# Patient Record
Sex: FEMALE | Race: WHITE | Employment: UNEMPLOYED | URBAN - METROPOLITAN AREA
[De-identification: names, ages, dates, MRNs, and addresses within clinical notes are randomized per-mention and may not be internally consistent; named-entity substitution may affect disease eponyms.]

---

## 2017-02-14 ENCOUNTER — OFFICE VISIT (OUTPATIENT)
Dept: FAMILY MEDICINE CLINIC | Age: 27
End: 2017-02-14

## 2017-02-14 ENCOUNTER — HOSPITAL ENCOUNTER (OUTPATIENT)
Dept: LAB | Age: 27
Discharge: HOME | End: 2017-02-14

## 2017-02-14 VITALS
TEMPERATURE: 98.7 F | SYSTOLIC BLOOD PRESSURE: 109 MMHG | WEIGHT: 181 LBS | DIASTOLIC BLOOD PRESSURE: 66 MMHG | HEART RATE: 71 BPM | HEIGHT: 62 IN | BODY MASS INDEX: 33.31 KG/M2

## 2017-02-14 DIAGNOSIS — E04.9 GOITER: Primary | ICD-10-CM

## 2017-02-14 DIAGNOSIS — E03.9 ACQUIRED HYPOTHYROIDISM: ICD-10-CM

## 2017-02-14 LAB
ALBUMIN SERPL BCP-MCNC: 4.1 G/DL (ref 3.5–5)
ALBUMIN/GLOB SERPL: 1.1 {RATIO} (ref 1.1–2.2)
ALP SERPL-CCNC: 170 U/L (ref 45–117)
ALT SERPL-CCNC: 31 U/L (ref 12–78)
ANION GAP BLD CALC-SCNC: 8 MMOL/L (ref 5–15)
AST SERPL W P-5'-P-CCNC: 16 U/L (ref 15–37)
BILIRUB SERPL-MCNC: 0.3 MG/DL (ref 0.2–1)
BUN SERPL-MCNC: 10 MG/DL (ref 6–20)
BUN/CREAT SERPL: 16 (ref 12–20)
CALCIUM SERPL-MCNC: 9.3 MG/DL (ref 8.5–10.1)
CHLORIDE SERPL-SCNC: 103 MMOL/L (ref 97–108)
CO2 SERPL-SCNC: 28 MMOL/L (ref 21–32)
CREAT SERPL-MCNC: 0.61 MG/DL (ref 0.55–1.02)
EST. AVERAGE GLUCOSE BLD GHB EST-MCNC: 108 MG/DL
GLOBULIN SER CALC-MCNC: 3.8 G/DL (ref 2–4)
GLUCOSE SERPL-MCNC: 77 MG/DL (ref 65–100)
HBA1C MFR BLD: 5.4 % (ref 4.2–6.3)
POTASSIUM SERPL-SCNC: 4.6 MMOL/L (ref 3.5–5.1)
PROT SERPL-MCNC: 7.9 G/DL (ref 6.4–8.2)
SODIUM SERPL-SCNC: 139 MMOL/L (ref 136–145)

## 2017-02-14 PROCEDURE — 84443 ASSAY THYROID STIM HORMONE: CPT | Performed by: NURSE PRACTITIONER

## 2017-02-14 PROCEDURE — 80053 COMPREHEN METABOLIC PANEL: CPT | Performed by: NURSE PRACTITIONER

## 2017-02-14 PROCEDURE — 83036 HEMOGLOBIN GLYCOSYLATED A1C: CPT | Performed by: NURSE PRACTITIONER

## 2017-02-14 RX ORDER — LEVOTHYROXINE SODIUM 125 UG/1
TABLET ORAL
COMMUNITY
End: 2017-02-15 | Stop reason: DRUGHIGH

## 2017-02-14 NOTE — PROGRESS NOTES
Tayo Barajas, MSN, RN, FNP-BC, BC-ADM  [facility] [place]:      Lorena Sen is an 27 y.o. female who presents for {thyroid dx:595619::\"hypothyroidism\"}. Chief Complaint   Patient presents with    Thyroid Problem     Recheck, med refill     She brought her medications in today. {yes/ no default no:19426::\"no\"}  Torsten Smith has run out of medications . {yes/ no default no:19426::\"no\"}  No outpatient prescriptions prior to visit. No facility-administered medications prior to visit. Thyroid  She has ***. She denies fatigue, weight changes, heat/cold intolerance, bowel/skin changes or cardiovascular symptoms      Objective:     Visit Vitals    /66 (BP 1 Location: Left arm, BP Patient Position: Sitting)    Pulse 71    Temp 98.7 °F (37.1 °C) (Oral)    Ht 5' 2.21\" (1.58 m)    Wt 181 lb (82.1 kg)    LMP 10/10/2016 (Exact Date)    BMI 32.89 kg/m2    Patient's last menstrual period was 10/10/2016 (exact date). Eyes: no proptosis. Lungs: CTAB. Heart: RRR, no MRG. Neck: {thyroid:621977::\"thyroid is normal in size without nodules or tenderness\"}. Laboratory: Labs reviewed and discussed with the patient. No results found for: TSH, TSH2, TSH3, TSHP, TSHELE, TSHEXT, TT3, T3U, T3UP, FRT3, FT3, FT4, FT4P, T4, T4P, FT4T, TT7, TSHEXT      Assessment/Plan:   There are no diagnoses linked to this encounter.

## 2017-02-14 NOTE — PROGRESS NOTES
Joseline Navarrete, MSN, RN, FNP-BC, BC-ADM  [facility] [place]  Subjective:      Saintclair Funk is an 27 y.o. female who presents for hypothyroidism. Chief Complaint   Patient presents with    Thyroid Problem     Recheck, med refill   Adebayo Churchill interpreting. She brought her medications in today. They are in the car. Her  brought them in. She feels her neck pulsing and feels choking. A doctor at [facility] checked her a month ago and said she needed a surgery. Current medication: levothyroxine 125 mcg. She is breastfeeding some/bottle feeding also, has 11month old girl, has gained 20 lbs since giving birth. Constipation, hard stool always and has bleeding  Has the contraceptive \"chip\" in her arm and has not had a period since then. Sudden stabbing pains in her eyes and burning sensation even while sleeping. no  Claude Augusto has run out of medications . no  She started with symptoms in 2014. She is from [place]. She was cold, had swelling in feet, was gaining weight, nausea. Now just having weight gain, choking. No outpatient prescriptions prior to visit. No facility-administered medications prior to visit. Thyroid  + weight gain while nursing her 11 month old, not eating a lot; + constipation; choking symptoms She denies fatigue, heat/cold intolerance, or cardiovascular symptoms      Objective:     Visit Vitals    /66 (BP 1 Location: Left arm, BP Patient Position: Sitting)    Pulse 71    Temp 98.7 °F (37.1 °C) (Oral)    Ht 5' 2.21\" (1.58 m)    Wt 181 lb (82.1 kg)    LMP 10/10/2016 (Exact Date)    BMI 32.89 kg/m2    Patient's last menstrual period was 10/10/2016 (exact date). has contraception, no periods. Eyes: no proptosis. Lungs: CTAB. Heart: RRR, no MRG. Neck: thyroid enlarged, no tremor noted. No nodules or tenderness. Assessment/Plan:   Claude Casas was seen today for thyroid problem.     Diagnoses and all orders for this visit:    Goiter    Acquired hypothyroidism  - TSH 3RD GENERATION; Future  -     HEMOGLOBIN A1C WITH EAG; Future  -     METABOLIC PANEL, COMPREHENSIVE; Future  -     COLLECTION VENOUS BLOOD,VENIPUNCTURE    She has a goiter and is symptomatic. Check TSH, follow. If high, consider TPO Ab and ultrasound. If low, Free T4, total T3, consult with Dr. Le eAnn Jenkins.

## 2017-02-15 DIAGNOSIS — E03.9 ACQUIRED HYPOTHYROIDISM: Primary | ICD-10-CM

## 2017-02-15 LAB — TSH SERPL DL<=0.05 MIU/L-ACNC: 0.25 UIU/ML (ref 0.36–3.74)

## 2017-02-15 RX ORDER — LEVOTHYROXINE SODIUM 112 UG/1
112 TABLET ORAL
Qty: 90 TAB | Refills: 0
Start: 2017-02-15 | End: 2017-02-21 | Stop reason: SDUPTHER

## 2017-02-16 NOTE — PROGRESS NOTES
She is currently taking too MUCH thyroid medication. I will send in a lower dose; return 6 weeks as a walk-in for labs and 8 weeks to see a provider. I will order all the tests needed.

## 2017-02-21 RX ORDER — LEVOTHYROXINE SODIUM 112 UG/1
112 TABLET ORAL
Qty: 90 TAB | Refills: 0 | Status: SHIPPED | OUTPATIENT
Start: 2017-02-21 | End: 2017-05-19 | Stop reason: SDUPTHER

## 2017-02-21 NOTE — PROGRESS NOTES
Contacted pt, no pharmacy was selected so routing the levothyroxine ordered by provider PATRICIO to [facility] [address], pt has appt for labs only visit on 3/28. Explained that pt needs to return in 8 weeks from now for a provider visit and to make the line. This has been fully explained to the patient, who indicates understanding.

## 2017-03-28 ENCOUNTER — HOSPITAL ENCOUNTER (OUTPATIENT)
Dept: LAB | Age: 27
Discharge: HOME | End: 2017-03-28

## 2017-03-28 ENCOUNTER — LAB ONLY (OUTPATIENT)
Dept: FAMILY MEDICINE CLINIC | Age: 27
End: 2017-03-28

## 2017-03-28 DIAGNOSIS — E03.9 ACQUIRED HYPOTHYROIDISM: ICD-10-CM

## 2017-03-28 LAB
T4 FREE SERPL-MCNC: 0.8 NG/DL (ref 0.8–1.5)
TSH SERPL DL<=0.05 MIU/L-ACNC: 1.58 UIU/ML (ref 0.36–3.74)

## 2017-03-28 PROCEDURE — 84480 ASSAY TRIIODOTHYRONINE (T3): CPT | Performed by: NURSE PRACTITIONER

## 2017-03-28 PROCEDURE — 84439 ASSAY OF FREE THYROXINE: CPT | Performed by: NURSE PRACTITIONER

## 2017-03-28 PROCEDURE — 84443 ASSAY THYROID STIM HORMONE: CPT | Performed by: NURSE PRACTITIONER

## 2017-03-30 LAB — T3 SERPL-MCNC: 109 NG/DL (ref 71–180)

## 2017-05-24 NOTE — TELEPHONE ENCOUNTER
called asking for med refill. Rx refill request in pending approval from Stephanie Mckeon.   Message forwarded to Michael Martinez

## 2017-05-26 RX ORDER — LEVOTHYROXINE SODIUM 112 UG/1
TABLET ORAL
Qty: 90 TAB | Refills: 0 | Status: SHIPPED | OUTPATIENT
Start: 2017-05-26 | End: 2017-07-18 | Stop reason: SDUPTHER

## 2017-05-26 NOTE — TELEPHONE ENCOUNTER
Called returned to spouse, Evan John, who is down as emergency contact on [facility]. Informed we need to speak to pt as medications sent to pharmacy today and appt for f/u will be placed in mail to pt. F/u appt scheduled on 7?18/17 at 1:15p w/ provider Jose Bello NP at [address] location and will be placed in mail to pt. Call assisted by [employer] #[phone].

## 2017-05-26 NOTE — TELEPHONE ENCOUNTER
2nd fax request from pharmacy for refill on Synthroid 112 mcg following a phone call from family 5/24/17 noted in system. Will refill for 90 tabs as heading into long holiday w/e. Routing to Dr Charlee Cazares and provider Essence Joseph.

## 2017-05-30 RX ORDER — LEVOTHYROXINE SODIUM 112 UG/1
TABLET ORAL
Qty: 90 TAB | Refills: 0 | Status: SHIPPED | OUTPATIENT
Start: 2017-05-30 | End: 2017-07-18 | Stop reason: SDUPTHER

## 2017-05-30 NOTE — TELEPHONE ENCOUNTER
Telephone call made to the patient with the assistance of [employer] interrpeter # [id number], to let her know about the Levothyroxine prescription sent in to the [facility], [address] [place] [address] for her. The patient expressed understanding and stated she will pick it up today.  Rosalva Gardner RN

## 2017-05-31 ENCOUNTER — TELEPHONE (OUTPATIENT)
Dept: FAMILY MEDICINE CLINIC | Age: 27
End: 2017-05-31

## 2017-05-31 NOTE — TELEPHONE ENCOUNTER
The pt's friend had called this am requesting Levothyroxine refill for the pt. Ovidio Lee RN    This nurse called the [facility] on on [address] The pt's medication had been filled and picked up last night, per the Pharmacy. Attempted calling the pt using  #[phone]. No answer. A message was left for the pt to call the [facility] and speak with the nurse.  Ovidio Lee RN

## 2017-07-18 ENCOUNTER — OFFICE VISIT (OUTPATIENT)
Dept: FAMILY MEDICINE CLINIC | Age: 27
End: 2017-07-18

## 2017-07-18 ENCOUNTER — HOSPITAL ENCOUNTER (OUTPATIENT)
Dept: LAB | Age: 27
Discharge: HOME | End: 2017-07-18

## 2017-07-18 VITALS
BODY MASS INDEX: 33.86 KG/M2 | TEMPERATURE: 98.7 F | WEIGHT: 184 LBS | HEIGHT: 62 IN | DIASTOLIC BLOOD PRESSURE: 75 MMHG | SYSTOLIC BLOOD PRESSURE: 109 MMHG | HEART RATE: 78 BPM

## 2017-07-18 DIAGNOSIS — E03.9 ACQUIRED HYPOTHYROIDISM: Primary | ICD-10-CM

## 2017-07-18 DIAGNOSIS — E03.9 ACQUIRED HYPOTHYROIDISM: ICD-10-CM

## 2017-07-18 LAB — TSH SERPL DL<=0.05 MIU/L-ACNC: 7.55 UIU/ML (ref 0.36–3.74)

## 2017-07-18 PROCEDURE — 84443 ASSAY THYROID STIM HORMONE: CPT | Performed by: NURSE PRACTITIONER

## 2017-07-18 RX ORDER — LEVOTHYROXINE SODIUM 112 UG/1
TABLET ORAL
Qty: 90 TAB | Refills: 0 | Status: SHIPPED | OUTPATIENT
Start: 2017-07-18 | End: 2017-07-30 | Stop reason: DRUGHIGH

## 2017-07-18 NOTE — PROGRESS NOTES
Coordination of Care  1. Have you been to the ER, urgent care clinic since your last visit? Hospitalized since your last visit? No    2. Have you seen or consulted any other health care providers outside of the [address] [place] [address] since your last visit? Include any pap smears or colon screening. No    Medications  Medication Reconciliation Performed: no  Patient does need refills     Learning Assessment Complete?  yes

## 2017-07-18 NOTE — MR AVS SNAPSHOT
Visit Information Excell Modesto Almeida Personal Médico Departamento Teléfono del Dep. Número de visita 7/18/2017  1:15 PM Preston Conteh NP [facility] [phone] Follow-up Instructions Return in about 2 months (around 9/18/2017) for as a walk-in for thyroid. Abby Jenise Upcoming Health Maintenance Date Due DTaP/Tdap/Td series (1 - Tdap) 4/8/2009 PAP AKA CERVICAL CYTOLOGY 4/8/2009 INFLUENZA AGE 9 TO ADULT 8/1/2017 Alergias  Review Complete El: 7/18/2017 Por: Parag Gut A partir del:  7/18/2017 No Known Allergies Vacunas actuales Sarahi Fletcher No hay ninguna vacuna archivada. No revisadas esta visita You Were Diagnosed With   
  
 [place] Acquired hypothyroidism    -  Primary ICD-10-CM: E03.9 ICD-9-CM: 244.9 Partes vitales PS Pulso Temperatura [place] ( percentil de crecimiento) Peso (percentil de crecimiento) [place] [place]  
 109/75 (BP 1 Location: Left arm, BP Patient Position: Sitting) 78 98.7 °F (37.1 °C) (Oral) 5' 2.3\" (1.582 m) 184 lb (83.5 kg) 33.33 kg/m2 Estado obstétrico Estatus de tabaquísmo Implant Never Smoker Historial de signos vitales BMI and BSA Data Body Mass Index Body Surface Area  
 33.33 kg/m 2 1.92 m 2 [facility] Name Phone [address] Al lista de medicamentos actualizada Lista actualizada el: 7/18/17  2:22 PM.  Siempre use al lista de medicamentos más reciente. levothyroxine 112 mcg tablet También conocido mich:  SYNTHROID  
TAKE ONE TABLET BY MOUTH ONCE DAILY (BEFORE BREAKFAST). Greek sig Recetas Enviado a la Nba Refills  
 levothyroxine (SYNTHROID) 112 mcg tablet 0 Sig: TAKE ONE TABLET BY MOUTH ONCE DAILY (BEFORE BREAKFAST). Greek sig  Class: Normal  
 Pharmacy: [facility] [address], [address] #: [phone] Instrucciones de seguimiento Return in about 2 months (around 9/18/2017) for as a walk-in for thyroid. Yahir Lazar Por hacer 07/18/2017 Lab:  TSH 3RD GENERATION   
  
 07/18/2017 Imaging:  US THYROID/PARATHYROID/SOFT TISS Introducing [facility]! [facility] introduce portal paciente MyChart . Ahora se puede acceder a partes de brown expediente médico, enviar por correo electrónico la oficina de brown médico y solicitar renovaciones de medicamentos en línea. En brown navegador de Internet , Clementeen Pretzel a [email or web address] [employer]. Pin [email or web address]/mychart Santiago clic en el usuario por Cedric Martinez? Taye See clic aquí en la sesión Deo [place]. Verá la página de registro [place]. Ingrese brown código de [employer] y mich aparece a continuación. Usted no tendrá que [employer] código después de morenita completado el proceso de registro . Si usted no se inscribe antes de la fecha de caducidad , debe solicitar un nuevo código. · MyChart Código de acceso : Radu Patel Expires: 10/16/2017  2:21 PM 
 
Ingresa los últimos cuatro dígitos de brown Número de Seguro Social ( xxxx ) y fecha de nacimiento ( dd / mm / aaaa ) mich se indica y santiago clic en Enviar. Usted será llevado a la siguiente página de registro . Crear un ID MyChart . Esta será brown ID de inicio de sesión de MyChart y no puede ser [place] , por lo que pensar en balaji que es Gemma Grills y fácil de recordar . Crear balaji contraseña MyChart . Usted puede cambiar brown contraseña en cualquier momento . Ingrese brown Password Reset de preguntas y Shanks . [place] se puede utilizar en un momento posterior si usted olvida brown contraseña. Introduzca brown dirección de correo electrónico . Dearl Bird recibirá balaji notificación por correo electrónico cuando la nueva información está disponible en MyChart . Arelis sousa en Registrarse. Cherylene Founds carson y descargar porciones de brown expediente médico. 
Ursula merry en el enlace de descarga del menú Resumen para descargar balaji copia portátil de brown información médica . Si tiene [employer] , por favor visite la sección de preguntas frecuentes del sitio web MyChart . Recuerde, MyChart NO es que se utilizará para las necesidades urgentes. Para emergencias médicas , llame al 911 . Ahora disponible en brown iPhone y Android ! Por favor proporcione cindy resumen de la documentación de cuidado a brown próximo proveedor. If you have any questions after today's visit, please call [phone].

## 2017-07-18 NOTE — PROGRESS NOTES
Reynold Lopez, MSN, RN, FNP-BC, BC-ADM  [facility]  Subjective:    Children ages 5, 10, and 3  Please schedule thyroid ultrasound. We will call her if medicines need changing based on labs. Have her please check what mcg dose she has been taking at home to make sure it is 112 mcg (the new dose). Follow up 2 months. If abnormal thyroid ultrasound requiring change in treatment plan, will call. Tiffany Tripathi is an 32 y.o. female who presents for hypothyroidism follow up. Chief Complaint   Patient presents with    Thyroid Problem     f/u   Her lips are very dry. Back on the medicine since June. Having constipation. She brought her medications in today. no  Andres Cabrales has run out of medications . no  Outpatient Medications Prior to Visit   Medication Sig Dispense Refill    levothyroxine (SYNTHROID) 112 mcg tablet TAKE ONE TABLET BY MOUTH ONCE DAILY (BEFORE BREAKFAST) 90 Tab 0    levothyroxine (SYNTHROID) 112 mcg tablet TAKE ONE TABLET BY MOUTH ONCE DAILY (BEFORE BREAKFAST) 90 Tab 0     No facility-administered medications prior to visit. Thyroid  She has gained weight, lips dry, constipation, and feels fatigued. She denies heat/cold intolerance or cardiovascular symptoms      Objective:     Visit Vitals    /75 (BP 1 Location: Left arm, BP Patient Position: Sitting)    Pulse 78    Temp 98.7 °F (37.1 °C) (Oral)    Ht 5' 2.3\" (1.582 m)    Wt 184 lb (83.5 kg)    BMI 33.33 kg/m2    No LMP recorded. Patient has had an implant. Eyes: no proptosis. Lungs: CTAB. Heart: RRR, no MRG. Neck: thyroid enlarged, no tremor noted. Laboratory: Labs reviewed and discussed with the patient. Lab Results   Component Value Date/Time    TSH 1.58 03/28/2017 10:56 AM    T4, Free 0.8 03/28/2017 10:56 AM         Assessment/Plan:   Andres Cabrales was seen today for thyroid problem.     Diagnoses and all orders for this visit:    Acquired hypothyroidism  -     US THYROID/PARATHYROID/SOFT TISS; Future

## 2017-07-18 NOTE — PROGRESS NOTES
Statements below were documented by Rika Cleaning RN My  for this patient visit was yualejoineenyPatient discharged home with AVS and Medication teaching . No further questions. Reviewed patient's medications, how to take, where to pickup and if any refills, patient verbalized understanding and has no questionsPatient given information about care card because the test ordered will be billed to patient . Patient explained to answer phone because a call would be coming in from [facility] to start the financial screening process fro Care Card. Patient verbalized understanding and has no questions . Patient explained that when bill is received to please call the number on the bill and explain that they are working on getting the care card. Patient acknowleged and understands the process without aany questions . Patient informed that if [facility] does not call to contact them in 3 weeks  to please give them a call . Thyroid ulltrasound Scheduled for July 25, 2017 ( Tuesday ) @ 0900 @ [facility] instructed to arrive at 0830 to register . Directions and address given to patient. Verbalized understanding of information discussed at discharge .  Rika Cleaning RN

## 2017-07-20 NOTE — PROGRESS NOTES
She said she had been taking the new dose of levothyroxine, 112 mcg. Now her thyroid test indicates this dose is too high. Will await ultrasound results, have her return with her medication bottle next time to visually verify current dose.

## 2017-07-25 ENCOUNTER — HOSPITAL ENCOUNTER (OUTPATIENT)
Dept: ULTRASOUND IMAGING | Age: 27
Discharge: HOME | End: 2017-07-25
Attending: NURSE PRACTITIONER
Payer: SELF-PAY

## 2017-07-25 DIAGNOSIS — E03.9 ACQUIRED HYPOTHYROIDISM: ICD-10-CM

## 2017-07-25 PROCEDURE — 76536 US EXAM OF HEAD AND NECK: CPT

## 2017-07-27 NOTE — PROGRESS NOTES
Correction: when her TSH increased to 7.55, this indicated that the dose of 112 mcg was too low. When she took the higher dose of 125 mcg levothyroxine, her TSH was too low (0.25).

## 2017-07-27 NOTE — PROGRESS NOTES
Adeladenis Garcia would first make sure she is compliant with medication and taking properly (Empty stomach, 30 min before food and coffee and all other pills and no vitamins within 4 hours) as if she has even missed 1 dose per week over the past 4-6 week, this will lead to a rise in TSH. It's likely the 125 mcg dose is a better dose for her so I would go back to this dose. It's possible some of the variability in her TSH is from the variability of generic and if she is unable to keep her TSH regulated on levothyroxine, I would try to get her 125 mcg synthroid tabs.     Pingree Flash

## 2017-07-27 NOTE — PROGRESS NOTES
Dr. Graceann Jeans, she has a goiter that by ultrasound is heterogeneous with no discrete nodules. On 125 mcg levothyroxine, her TSH was 0.25. I lowered her to 112 mcg levothyroxine, and then her TSH was 7.55 with hypothyroid symptoms. Does this need additional workup? If she needs an in-between dose, might I try 125mcg, take 6 days a week? Thank you!

## 2017-07-27 NOTE — PROGRESS NOTES
When her TSH was too low, I decreased her levothyroxine dose from 125 mcg to 112 mcg. Then, her TSH was normal.  She continued taking the 112 mcg and then her TSH was elevated. She has a heterogeneous goiter on ultrasound with no nodules.

## 2017-07-30 DIAGNOSIS — E03.9 ACQUIRED HYPOTHYROIDISM: Primary | ICD-10-CM

## 2017-07-30 RX ORDER — LEVOTHYROXINE SODIUM 125 UG/1
125 TABLET ORAL
Qty: 90 TAB | Refills: 0 | Status: SHIPPED | OUTPATIENT
Start: 2017-07-30 | End: 2017-11-11 | Stop reason: SDUPTHER

## 2017-07-30 NOTE — PROGRESS NOTES
Nurses, after discussion with Dr. Omar Mccall, Ms. Julio Cesar Casas needs to STOP the 112 mcg dose of levothyroxine. I am sending in the 125 mcg tabs to take 1 po daily. These need to be taken 30 minutes before food, coffee, and all other pills; no vitamins within 4 hours. In 8 weeks (around 10/1/17), she is to return for lab tests. I will pre-order those tests now.

## 2017-07-30 NOTE — PROGRESS NOTES
See Result Note.   Orders Placed This Encounter    TSH 3RD GENERATION to be drawn on or after 10/1/2017    levothyroxine (SYNTHROID) 125 mcg tablet

## 2017-07-31 NOTE — PROGRESS NOTES
Attempted to reach patient, left VM to please call cvan office and speak to nurse re: important message from your Dr re: medication.

## 2017-08-02 ENCOUNTER — TELEPHONE (OUTPATIENT)
Dept: FAMILY MEDICINE CLINIC | Age: 27
End: 2017-08-02

## 2017-08-02 NOTE — PROGRESS NOTES
4 hours ago (11:13 AM)                   You 4 hours ago (10:45 AM)              The pt was called regarding the provider message after her thyroid US. The pt was given the message to stop her Levothyroxine 112 mcg, and go  new rx at her Pharmacy, and start taking it. The new rx is Levothyroxine 125 mcg. The pt's Pharmacy was confirmed. The pt was instructed to take the medication 30 min before food or coffee and not to take any other pills with the Levothyroxine. The pt was told not to take any Vitamins for 4 hrs after taking the Levothyroxine. The pt verbalized understanding and stated she takes her Vitamin at night. The  who assisted with the call was [employer]. #[phone]. The pt was instructed to return to the clinic in 8 weeks to have her blood work repeated. She was told to return around 10/01/17. She could walk in and just let the registrar know she was there for labs only. The order was already in her chart for what she needed drawn, per provider. The pt asked about her Thyroid US results. There was no provider note specifically  addressing the results. The pt was told a message would be routed to the provider and the nurse would call her back.   Domonique Gan RN                     Documentation

## 2017-08-02 NOTE — TELEPHONE ENCOUNTER
The pt was called regarding the provider message after her thyroid US. The pt was given the message to stop her Levothyroxine 112 mcg, and go  new rx at her Pharmacy, and start taking it. The new rx is Levothyroxine 125 mcg. The pt's Pharmacy was confirmed. The pt was instructed to take the medication 30 min before food or coffee and not to take any other pills with the Levothyroxine. The pt was told not to take any Vitamins for 4 hrs after taking the Levothyroxine. The pt verbalized understanding and stated she takes her Vitamin at night. The  who assisted with the call was [employer]. #[phone]. The pt was instructed to return to the clinic in 8 weeks to have her blood work repeated. She was told to return around 10/01/17. She could walk in and just let the registrar know she was there for labs only. The order was already in her chart for what she needed drawn, per provider. The pt asked about her Thyroid US results. There was no provider note specifically addressing the results. The pt was told a message would be routed to the provider and the nurse would call her back.   Earle Lopez RN

## 2017-08-03 NOTE — PROGRESS NOTES
The thyroid ultrasound showed that her thyroid is enlarged - this is not a new finding. There are no \"nodules\" so there is no concern for cancer. The thyroid function is off - that is why a dose change in medication is needed at this time.

## 2017-08-09 NOTE — PROGRESS NOTES
Telephone call made to the patient with assistance from [employer]  # [phone]. We reviewed the provider's result note about her Thyroid US and the patient stated she has been taking the prescribed Levothyroxine. She then asked about what to do with her bill for the [address] [facility] and stated that she has called the phone number for the Care Card, but has not received a return call. Advised the patient that the [facility] is not involved with any billing and to call the phone number on the bill to let them know she is waiting to hear about her Care Card. She can also call the Care Card phone number again and may bring the bill to one of the [facility] clinics and meet with a SW/OW for help. The patient expressed understanding.  Violeta Cazares RN

## 2017-11-11 ENCOUNTER — OFFICE VISIT (OUTPATIENT)
Dept: FAMILY MEDICINE CLINIC | Age: 27
End: 2017-11-11

## 2017-11-11 VITALS
DIASTOLIC BLOOD PRESSURE: 67 MMHG | SYSTOLIC BLOOD PRESSURE: 102 MMHG | TEMPERATURE: 98.3 F | WEIGHT: 176 LBS | HEART RATE: 66 BPM | BODY MASS INDEX: 31.88 KG/M2

## 2017-11-11 DIAGNOSIS — E03.9 ACQUIRED HYPOTHYROIDISM: Primary | ICD-10-CM

## 2017-11-11 DIAGNOSIS — Z23 ENCOUNTER FOR IMMUNIZATION: ICD-10-CM

## 2017-11-11 DIAGNOSIS — Z30.41 FAMILY PLANNING, BCP (BIRTH CONTROL PILLS) MAINTENANCE: ICD-10-CM

## 2017-11-11 DIAGNOSIS — M54.50 ACUTE MIDLINE LOW BACK PAIN WITHOUT SCIATICA: ICD-10-CM

## 2017-11-11 RX ORDER — CYCLOBENZAPRINE HCL 10 MG
10 TABLET ORAL
Qty: 20 TAB | Refills: 0 | Status: SHIPPED | OUTPATIENT
Start: 2017-11-11

## 2017-11-11 RX ORDER — LEVOTHYROXINE SODIUM 125 UG/1
125 TABLET ORAL
Qty: 90 TAB | Refills: 0 | Status: SHIPPED | OUTPATIENT
Start: 2017-11-11 | End: 2017-12-28 | Stop reason: SDUPTHER

## 2017-11-11 RX ORDER — IBUPROFEN 800 MG/1
800 TABLET ORAL
Qty: 40 TAB | Refills: 1 | Status: SHIPPED | OUTPATIENT
Start: 2017-11-11

## 2017-11-11 NOTE — PROGRESS NOTES
Parris Schaeffer seen at d/c, given AVS and reviewed today's visit with patient. Discussed Rxs sent to her pharmacy and to please take ibuprofen with food and take as directed. Pt has the follow-up appts scheduled for lab appt and provider appt. Pt has back exercises included in there AVS which were pointed out to her. Patient told to call clinic office with any questions/concerns about their visit, phone # given; explained to leave one voice message if no one answers phone with their name, phone #,  and reason for call and someone will return their call. This has been fully explained to the patient, who indicates understanding. Parris Schaeffer completed screening documentation. No contraindications for administering today's ordered vaccines. Vaccine Immunization Statement(s) given and instructions for adverse reaction. No adverse reaction noted at time of discharge, explained possible s/e. Reviewed symptoms indicating need to be seen in ER. Patient given flu vaccine; denies fever. Pt had no adverse reaction at time of d/c. Vaccine administration documented into [place] Immunization Information System.

## 2017-11-11 NOTE — PROGRESS NOTES
Subjective:     Chief Complaint   Patient presents with    Thyroid Problem     follow-up and needs medication refills    Back Pain     for one week, low back, non radiating, not injury related        She  is a 32 y.o. female who presents for evaluation of LBP x 8 days. Denies any acute injury or accident at onset of back pain. Pt reports pain is midline and does not radiate into legs. No attempted remedies. Denies any B&B dysfunction nor saddle anesthesia. Pt is also here for routine thyroid follow-up. ROS  Gen - no fever/chills  Resp - no dyspnea or cough  CV - no chest pain or HANKS  Rest per HPI    No past medical history on file. No past surgical history on file. Current Outpatient Prescriptions on File Prior to Visit   Medication Sig Dispense Refill    levothyroxine (SYNTHROID) 125 mcg tablet Take 1 Tab by mouth Daily (before breakfast). Polish sig 90 Tab 0     No current facility-administered medications on file prior to visit. Objective:     Vitals:    11/11/17 1137   BP: 102/67   Pulse: 66   Temp: 98.3 °F (36.8 °C)   TempSrc: Oral   Weight: 176 lb (79.8 kg)       Physical Examination:  General appearance - alert, well appearing, and in no distress  Eyes -sclera anicteric  Neck - supple, no significant adenopathy, no thyromegaly  Chest - clear to auscultation, no wheezes, rales or rhonchi, symmetric air entry  Heart - normal rate, regular rhythm, normal S1, S2, no murmurs, rubs, clicks or gallops  Neurological - alert, oriented, no focal findings or movement disorder noted    Neg SLR, no palpable abnormalities on L spine, ROM WNL      Assessment/ Plan:   Diagnoses and all orders for this visit:    1. Acquired hypothyroidism  -     TSH 3RD GENERATION; Future  -     levothyroxine (SYNTHROID) 125 mcg tablet; Take 1 Tab by mouth Daily (before breakfast). Polish sig    2. Family planning, BCP (birth control pills) maintenance    3.  Acute midline low back pain without sciatica  -     ibuprofen (MOTRIN) 800 mg tablet; Take 1 Tab by mouth every eight (8) hours as needed for Pain. -     cyclobenzaprine (FLEXERIL) 10 mg tablet; Take 1 Tab by mouth nightly as needed for Muscle Spasm(s). ? Etiology of LBP. Recommend NSAIDs, QHS flexeril, heat/ice, OTC topical creams and exercises/stretches. Given 3-4 missed doses of synthroid, will refill Rx and request Pt return for lab TSH check 1-2 weeks before provider f/u. Adding Nexplanon to Rx list.     Wilfrido Sheehan to give flu vaccine. I have discussed the diagnosis with the patient and the intended plan as seen in the above orders. The patient has received an after-visit summary and questions were answered concerning future plans. I have discussed medication side effects and warnings with the patient as well. The patient verbalizes understanding and agreement with the plan.     Follow-up Disposition: Not on File

## 2017-11-11 NOTE — PROGRESS NOTES
Coordination of Care  1. Have you been to the ER, urgent care clinic since your last visit? Hospitalized since your last visit? No    2. Have you seen or consulted any other health care providers outside of the [address] [place] [address] since your last visit? Include any pap smears or colon screening. No    Medications  Does the patient need refills? YES    Learning Assessment Complete?  yes

## 2017-12-28 ENCOUNTER — TELEPHONE (OUTPATIENT)
Dept: FAMILY MEDICINE CLINIC | Age: 27
End: 2017-12-28

## 2017-12-28 ENCOUNTER — CLINICAL SUPPORT (OUTPATIENT)
Dept: FAMILY MEDICINE CLINIC | Age: 27
End: 2017-12-28

## 2017-12-28 ENCOUNTER — HOSPITAL ENCOUNTER (OUTPATIENT)
Dept: LAB | Age: 27
Discharge: HOME | End: 2017-12-28

## 2017-12-28 DIAGNOSIS — E03.9 ACQUIRED HYPOTHYROIDISM: ICD-10-CM

## 2017-12-28 LAB — TSH SERPL DL<=0.05 MIU/L-ACNC: 17 UIU/ML (ref 0.36–3.74)

## 2017-12-28 PROCEDURE — 84443 ASSAY THYROID STIM HORMONE: CPT | Performed by: NURSE PRACTITIONER

## 2017-12-28 RX ORDER — LEVOTHYROXINE SODIUM 125 UG/1
125 TABLET ORAL
Qty: 8 TAB | Refills: 0 | Status: SHIPPED | OUTPATIENT
Start: 2017-12-28 | End: 2018-01-05 | Stop reason: SDUPTHER

## 2017-12-28 NOTE — PROGRESS NOTES
Patient presents for lab draw ordered by: AYLEEN Gruber    The following labs were drawn Eva Lobe, LPN    TSH, 3rd Generation    The following tubes were sent:  Gold  ( 1)

## 2017-12-28 NOTE — TELEPHONE ENCOUNTER
Patient came in for labs today and requested to speak with a nurse in regards a medication question. Patient is requesting a medication refill for Levothyroxine 125mcg at this time, to be [facility] to [facility] at [address]. Patient states that she cannot find her Levothyroxine 125mcg medication bottle that was recently filled on 11/2017 with 90 tabs. Patient has not been taking this medications since 5 days ago due to the medication being missing. Patient has small children in the house, so patient was instructed to look all around the house for the medication, to prevent her children from finding her medication, and to prevent complications. Patient does have an upcoming appt.  On 1/4/17.   Radha Cueto RN

## 2018-01-04 ENCOUNTER — TELEPHONE (OUTPATIENT)
Dept: FAMILY MEDICINE CLINIC | Age: 28
End: 2018-01-04

## 2018-01-04 DIAGNOSIS — E03.9 ACQUIRED HYPOTHYROIDISM: ICD-10-CM

## 2018-01-04 NOTE — TELEPHONE ENCOUNTER
RN returned call to pt with the assistance of  # [phone]. RN explained to pt that a message was sent to the provider and she should be receiving a phone call within the next few days regarding her thyroid medication. RN advised her that if she does not receive a return call by 1/8/18, she should call [place] [phone] office and leave a message. She has enough medication to last through 1/9/18. Pt expressed understanding of the above information. Jovani Horta.

## 2018-01-04 NOTE — TELEPHONE ENCOUNTER
Due to inclement weather; we had to re-schedule her appointment. New appointment on 1/24/17. However patient only received (8) pills and will be in need of more. Only (5) left as of 1/4/17. Refills till her 1/24/17 will be needed. Please triage and call in another refill.

## 2018-01-05 RX ORDER — LEVOTHYROXINE SODIUM 125 UG/1
125 TABLET ORAL
Qty: 30 TAB | Refills: 0 | Status: SHIPPED | OUTPATIENT
Start: 2018-01-05 | End: 2018-01-24 | Stop reason: DRUGHIGH

## 2018-01-05 NOTE — TELEPHONE ENCOUNTER
Refill for 30 days sent to [facility]. Will confirm Pt has been taking as directed, 1 hour before meals w/ water only and not skipping doses. May swap to name brand synthroid via [facility] given last elevated TSH levels.

## 2018-01-08 NOTE — TELEPHONE ENCOUNTER
Called to [address] re Levothyroxine 125 mcg \"pt picked up in Nov 2017 and again 1/1/18 just 8 tabs and new bottle has been ready since 1/5/18 w/ 30 tabs\". Reached pt w/ [facility] phone  # [phone] and instructed bottle is ready for  of 30 tabs Levothyroxine 125mcg and instructed to keep 1/24/18 appt. Asked for address to be sent to 1/24/18 appt and placed CAV Jan 2018 calender in mail.

## 2018-01-11 ENCOUNTER — TELEPHONE (OUTPATIENT)
Dept: FAMILY MEDICINE CLINIC | Age: 28
End: 2018-01-11

## 2018-01-11 NOTE — TELEPHONE ENCOUNTER
Patient went yesterday to  her prescription, because she didn't have transportation, and [facility] DR KIERAN ROJAS said there are no meds there. At this time, she still needs her meds but seems to have transportation problems. Please call patient after this is addressed.

## 2018-01-12 NOTE — TELEPHONE ENCOUNTER
RN called [facility] to inquire if pt picked up Levothyroxine today. Pharmacy advised that she did pick it up.   Enio Arevalo RN

## 2018-01-24 ENCOUNTER — OFFICE VISIT (OUTPATIENT)
Dept: FAMILY MEDICINE CLINIC | Age: 28
End: 2018-01-24

## 2018-01-24 VITALS
WEIGHT: 177 LBS | HEART RATE: 76 BPM | TEMPERATURE: 98.6 F | SYSTOLIC BLOOD PRESSURE: 108 MMHG | DIASTOLIC BLOOD PRESSURE: 71 MMHG | BODY MASS INDEX: 32.06 KG/M2

## 2018-01-24 DIAGNOSIS — E03.9 ACQUIRED HYPOTHYROIDISM: Primary | ICD-10-CM

## 2018-01-24 RX ORDER — LEVOTHYROXINE SODIUM 150 UG/1
150 TABLET ORAL
Qty: 30 TAB | Refills: 2 | Status: SHIPPED | OUTPATIENT
Start: 2018-01-24 | End: 2018-07-09 | Stop reason: SDUPTHER

## 2018-01-24 NOTE — PATIENT INSTRUCTIONS
Levotriroxina (Por la boca)   Trata el hipotiroidismo. También trata la glándula tiroides agrandada y cáncer en la tiroides. Maxime(s) : Levoxyl, Synthroid, Tirosint, Unithroid   Existen muchas otras marcas de Dueñas. Corrine medicamento no debe ser usado cuando:   Corrine medicamento no es adecuado para todas las personas. No lo use si usted ha tenido balaji reacción alérgica al glicerol o tuvo un ataque al corazón reciente. Forma de usar corrine medicamento:   [place], [place], Tableta  · [place] diya medicamentos mich se le haya indicado. Es probable que sea necesario cambiar brown dosis varias veces hasta encontrar la que funciona mejor para usted. Puede que tenga que clayton Dueñas sofia 6 a 8 semanas antes de que diya síntomas comienzan a mejorar. · [place] y siga las instrucciones para el paciente que vienen con el medicamento. Hable con brown médico o farmacéutico si tiene alguna pregunta. · [facility] medicamento por la mañana con el estómago vacío. Espere por lo menos 30 a 60 minutos antes de comer cualquier alimento. · [place]: Tráguela entera. No la guillaume ni triture. · Solución oral:   ¨ Corrine medicamento puede mezclarse con agua o administrarse directamente en la boca. ¨ Para mezclar con agua:  Exprima el contenido de 1 ampolla de dosis [place] en un vaso o balaji taza con agua. Revuelva y tome la mezcla inmediatamente. Añada algo de agua al vaso o la taza y tómela. [place] lo ayudará a que no quede medicamento en el vaso o la taza. No mezcle corrine medicamento con ningún otro líquido excepto agua. No la guarde para usarla más adelante. ¨ Para clayton sin agua: Exprima el medicamento directamente en la boca o en balaji Edouard Idris y tráguelo inmediatamente. · Tableta: Si corrine medicamento se le está dando a un bebé o un maddy pequeño, usted puede triturar la tableta y mezclarla con 1 o 2 cucharaditas (5 a 10 mililitros) de agua. Esta mezcla se puede kevin con balaji cuchara o un gotero.  No mezcle la tableta con ningún otro líquido excepto agua. No guarde la mezcla. Si usted no da el medicamento inmediatamente después de que se [place], tirarlo a la basura. · Si olvida balaji dosis: Si olvida balaji dosis de brown medicamento, tómelo lo más pronto posible. Si es arnaldo la hora para brown próxima dosis, espere hasta entonces para clayton brown dosis regular. No use medicamento adicional para reponer la dosis olvidada. · Guarde el medicamento en un recipiente cerrado a temperatura ambiente y alejado del calor, la humedad y la ruby directa. Utilice el líquido oral dentro de los 15 días después de abrir la bolsa. Mantenga las ampollas en la bolsa hasta que usted esté listo para usarlas. Medicamentos y [employer] que debe evitar:   Consulte con brown médico o farmacéutico antes de usar cualquier medicamento, incluyendo los que compra sin receta médica, las vitaminas y los productos herbales. · Algunos alimentos y medicamentos pueden afectar el funcionamiento de la levotiroxina. Informe a brown médico si está usando cualquiera de los siguientes:  ¨ Amiodarona, dexametasona, digoxina, imatinib, ketamina, fenobarbital, rifampicina  ¨ Medicamento con betabloqueadores  ¨ Anticoagulantes (incluyendo heparina, warfarina)  ¨ Insulina o un medicamento para la diabetes  ¨ Medicamento para tratar la depresión  · Si utiliza antiácidos, medicamentos para tratar el colesterol alto (mich colestiramina, colesevelam, colestipol), orlistat, sevelamer, sucralfato, medicamentos para el estómago (incluyendo lansoprazol, omeprazol, pantoprazol) o cualquier medicamento que contenga calcio o valery, tómelo por lo menos 4 horas antes o 4 horas después de clayton levotiroxina. · Harina de semilla de algodón, fibra dietética, la harina de soya (fórmula infantil), o nueces pueden disminuir la absorción de [employer]. Hable con brown médico si tiene preguntas. · No coma toronja ni tome jugo de toronja mientras esté .   Precauciones sofia el uso de Krystle medicamento: · Informe a brown médico si usted está embarazada o amamantando, o si tiene anemia, problemas de coagulación de la dieudonne, diabetes, enfermedad cardíaca, osteoporosis, problemas de la glándula pituitaria o suprarrenal. Informe a brown médico si ha recibido recientemente radioterapia con yodo. No utilice cindy medicamento para tratar la obesidad o perder [place]. · Informe a todos los médicos o dentistas que lo atiendan que usted está tomando cindy medicamento. · No suspenda el uso de cindy medicamento súbitamente. Brown médico necesitará disminuir brown dosis poco a poco antes de suspender el medicamento por completo. · El médico solicitará exámenes de laboratorio sofia las citas de rutina para revisar los efectos de [place]. Asista a todas diya citas. · Guarde todos los medicamentos fuera del alcance de los niños. Nunca comparta diya medicamentos con [employer]. Efectos secundarios que pueden presentarse sofia el uso de cindy medicamento:   Consulte inmediatamente con el médico si nota cualquiera de estos efectos secundarios:  · Reacción alérgica: Comezón o ronchas, hinchazón del isidra o las joshua, hinchazón u hormigueo en la boca o garganta, opresión en el pecho, dificultad para respirar  · Dolor en el pecho que puede propagarse, dificultad para respirar, sudoración inusual, desmayos  · Ritmo cardíaco acelerado, golpeante o irregular  · Convulsiones o temblores  · Dolor de faith severo, visión borrosa o doble, náusea, vómito (en niños)  · Cojea al caminar, dolor en la rodilla o la cadera (en los niños)  Consulte con el médico si nota los siguientes efectos secundarios menos graves:   · Cambios de apetito o peso  · Cambios en los periodos menstruales  · Pérdida de benjamin  · Nerviosismo, sensibilidad al calor, sudoración  Consulte con el médico si nota otros efectos secundarios que vero son causados por cindy medicamento. Llame a brown médico para consultarle [place].  Usted puede notificar diya efectos secundarios al FDA al [phone]-FOH-[phone]. © 2017 [address] Information is for End User's use only and may not be sold, redistributed or otherwise used for commercial purposes. Esta información es sólo para uso en educación. Brown intención no es darle un consejo médico sobre enfermedades o tratamientos. Colsulte con brown Crystal Laos farmacéutico antes de seguir cualquier régimen médico para saber si es seguro y efectivo para usted.

## 2018-01-24 NOTE — PROGRESS NOTES
Coordination of Care  1. Have you been to the ER, urgent care clinic since your last visit? Hospitalized since your last visit? No    2. Have you seen or consulted any other health care providers outside of the [address] [place] [address] since your last visit? Include any pap smears or colon screening. No    Medications  Does the patient need refills? YES    Learning Assessment Complete?  yes

## 2018-01-24 NOTE — PROGRESS NOTES
Subjective:     Chief Complaint   Patient presents with    Thyroid Problem     Thyroid Problem recheck. Pt. is not feeling well today        She  is a 32 y.o. female who presents for evaluation of hypothyroidism. Pt reports approx 2 weeks ago, she did not take synthroid r/t being out of it. Pt reports continued fatigue, hair loss and poor sleep. Has not tried higher dose than 125mcg/dailly. Confirms she is taking it in AM, on an empty stomach, 1 hr before meals w/ only water. No other acute concerns today. ROS  Gen - no fever/chills  Resp - no dyspnea or cough  CV - no chest pain or HANKS  Rest per HPI    No past medical history on file. No past surgical history on file. Current Outpatient Prescriptions on File Prior to Visit   Medication Sig Dispense Refill    levothyroxine (SYNTHROID) 125 mcg tablet Take 1 Tab by mouth Daily (before breakfast). 1 hour before food & w/ water only. [place] 1 tableta por la manana 1 hora antes de comer. 30 Tab 0    etonogestrel (NEXPLANON) 68 mg impl Placed by [facility] in 09/2016. 1 Each 0    ibuprofen (MOTRIN) 800 mg tablet Take 1 Tab by mouth every eight (8) hours as needed for Pain. 40 Tab 1    cyclobenzaprine (FLEXERIL) 10 mg tablet Take 1 Tab by mouth nightly as needed for Muscle Spasm(s). 20 Tab 0     No current facility-administered medications on file prior to visit.          Objective:     Vitals:    01/24/18 1402   BP: 108/71   Pulse: 76   Temp: 98.6 °F (37 °C)   TempSrc: Oral   Weight: 177 lb (80.3 kg)       Physical Examination:  General appearance - alert, well appearing, and in no distress  Eyes -sclera anicteric  Neck - supple, no significant adenopathy, no thyromegaly  Chest - clear to auscultation, no wheezes, rales or rhonchi, symmetric air entry  Heart - normal rate, regular rhythm, normal S1, S2, no murmurs, rubs, clicks or gallops  Neurological - alert, oriented, no focal findings or movement disorder noted      Assessment/ Plan: Diagnoses and all orders for this visit:    1. Acquired hypothyroidism  -     levothyroxine (SYNTHROID) 150 mcg tablet; Take 1 Tab by mouth Daily (before breakfast). -     TSH 3RD GENERATION; Future    Discussed options of trial of higher dose of generic synthroid vs starting process to get brand name via [facility]. Pt opted for $4 version first.     Increase levothyroxine to 125mcg daily. Return for labs 1 week prior to [facility] for TSH check. Flu vaccine UTD. I have discussed the diagnosis with the patient and the intended plan as seen in the above orders. The patient has received an after-visit summary and questions were answered concerning future plans. I have discussed medication side effects and warnings with the patient as well. The patient verbalizes understanding and agreement with the plan.     Follow-up Disposition: Not on File

## 2018-01-24 NOTE — MR AVS SNAPSHOT
Nyla Mcghee 
 
 
 [address] [place] [address] 210 [facility] 57 
[phone] Patient: Brenda Rincon MRN: [id number] WANDA:[id number] Visit Information Melinda Ely Personal Médico Departamento Teléfono del Dep. Número de visita 1/24/2018  1:15 PM Isamar Dickson NP [place] [address] [phone] Follow-up Instructions Return in about 6 weeks (around 3/7/2018). Upcoming Health Maintenance Date Due DTaP/Tdap/Td series (1 - Tdap) 4/8/2009 PAP AKA CERVICAL CYTOLOGY 4/8/2009 Alergias  Review Complete El: 1/24/2018 Por: BALBIR Elizabeth del:  1/24/2018 No Known Allergies Vacunas actuales [id number] el:  11/11/2017 Hellen Burden Influenza Vaccine (Quad) PF 11/11/2017 No revisadas esta visita You Were Diagnosed With   
  
 Ron Orozco Acquired hypothyroidism    -  Primary ICD-10-CM: E03.9 ICD-9-CM: 244.9 Partes vitales PS Pulso Temperatura Peso (percentil de crecimiento) BMI (IMC) Estado obstétrico  
 108/71 (BP 1 Location: Right arm, BP Patient Position: Sitting) 76 98.6 °F (37 °C) (Oral) 177 lb (80.3 kg) 32.06 kg/m2 Implant Estatus de tabaquísmo Never Smoker BMI and BSA Data Body Mass Index Body Surface Area 32.06 kg/m 2 1.88 m 2 [facility] Name Phone [address] [facility] [phone] Al lista de medicamentos actualizada Lista actualizada el: 1/24/18  3:15 PM.  Emma Thrasher use al lista de medicamentos más reciente. cyclobenzaprine 10 mg tablet También conocido mich:  FLEXERIL Take 1 Tab by mouth nightly as needed for Muscle Spasm(s). ibuprofen 800 mg tablet También conocido mich:  MOTRIN Take 1 Tab by mouth every eight (8) hours as needed for Pain. levothyroxine 150 mcg tablet También conocido mich:  SYNTHROID Take 1 Tab by mouth Daily (before breakfast). NEXPLANON 68 mg Impl Medicamento genérico:  etonogestrel Placed by [facility] in 09/2016. Recetas Enviado a la Nba Refills  
 levothyroxine (SYNTHROID) 150 mcg tablet 2 Sig: Take 1 Tab by mouth Daily (before breakfast). Class: Normal  
 Pharmacy: [facility] [address], [address] [place] [address] #: [phone] Route: Oral  
  
Instrucciones de seguimiento Return in about 6 weeks (around 3/7/2018). Por hacer 02/23/2018 Lab:  TSH 3RD GENERATION Instrucciones para el Paciente Levotriroxina (Por la boca) Trata el hipotiroidismo. También trata la glándula tiroides agrandada y cáncer en la tiroides. Maxime(s) : Levoxyl, Synthroid, Tirosint, Unithroid Existen muchas otras marcas de Dueñas. Corrine medicamento no debe ser usado cuando:  
Corrine medicamento no es adecuado para todas las personas. No lo use si usted ha tenido balaji reacción alérgica al glicerol o tuvo un ataque al corazón reciente. Rhea de usar corrine medicamento:  
Storm Vinnie · [place] diya medicamentos mich se le haya indicado. Es probable que sea necesario cambiar brown dosis varias veces hasta encontrar la que funciona mejor para usted. Puede que tenga que clayton Dueñas sofia 6 a 8 semanas antes de que diya síntomas comienzan a mejorar. · Hansa y siga las instrucciones para el paciente que vienen con el medicamento. Hable con brown médico o farmacéutico si tiene alguna pregunta. · [facility] medicamento por la mañana con el estómago vacío. Espere por lo menos 30 a 60 minutos antes de comer cualquier alimento. · Cápsula: Tráguela entera. No la guillaume ni triture. · Solución oral: ¨ Corrine medicamento puede mezclarse con agua o administrarse directamente en la boca. ¨ Para mezclar con agua:  Exprima el contenido de 1 ampolla de dosis [place] en un vaso o balaji taza con agua. Revuelva y tome la mezcla inmediatamente. Añada algo de agua al vaso o la taza y tómela. [place] lo ayudará a que no quede medicamento en el vaso o la taza. No mezcle cindy medicamento con ningún otro líquido excepto agua. No la guarde para usarla más adelante. ¨ Para clayton sin agua: Exprima el medicamento directamente en la boca o en balaji Dyann Sheen y tráguelo inmediatamente. · Tableta: Si cindy medicamento se le está dando a un bebé o un maddy pequeño, usted puede triturar la tableta y mezclarla con 1 o 2 cucharaditas (5 a 10 mililitros) de agua. Esta mezcla se puede kevin con balaji cuchara o un gotero. No mezcle la tableta con ningún otro líquido excepto agua. No guarde la mezcla. Si usted no da el medicamento inmediatamente después de que se [place], tirarlo a la basura. · Si olvida balaji dosis: Si olvida balaji dosis de brown medicamento, tómelo lo más pronto posible. Si es arnaldo la hora para brown próxima dosis, espere hasta entonces para clayton brown dosis regular. No use medicamento adicional para reponer la dosis olvidada. · Guarde el medicamento en un recipiente cerrado a temperatura ambiente y alejado del calor, la humedad y la ruby directa. Utilice el líquido oral dentro de los 15 días después de abrir la bolsa. Mantenga las ampollas en la bolsa hasta que usted esté listo para usarlas. Medicamentos y [employer] que debe evitar:  
Consulte con brown médico o farmacéutico antes de usar cualquier medicamento, incluyendo los que compra sin receta médica, las vitaminas y los productos herbales. · Algunos alimentos y medicamentos pueden afectar el funcionamiento de la levotiroxina. Informe a brown médico si está usando cualquiera de los siguientes: ¨ Amiodarona, dexametasona, digoxina, imatinib, ketamina, fenobarbital, rifampicina ¨ Medicamento con betabloqueadores ¨ Anticoagulantes (incluyendo heparina, warfarina) ¨ Insulina o un medicamento para la diabetes ¨ Medicamento para tratar la depresión · Si utiliza antiácidos, medicamentos para tratar el colesterol alto (mich colestiramina, colesevelam, colestipol), orlistat, sevelamer, sucralfato, medicamentos para el estómago (incluyendo lansoprazol, omeprazol, pantoprazol) o cualquier medicamento que contenga calcio o valery, tómelo por lo menos 4 horas antes o 4 horas después de clayton levotiroxina. · Harina de semilla de algodón, fibra dietética, la harina de soya (fórmula infantil), o nueces pueden disminuir la absorción de [employer] Hable con brown médico si tiene preguntas. · No coma toronja ni tome jugo de toronja mientras esté . Precauciones sofia el uso de Krystle medicamento: · Informe a brown médico si usted está embarazada o amamantando, o si tiene anemia, problemas de coagulación de la dieudonne, diabetes, enfermedad cardíaca, osteoporosis, problemas de la glándula pituitaria o suprarrenal. Informe a brown médico si ha recibido recientemente radioterapia con yodo. No utilice cindy medicamento para tratar la obesidad o perder [employer]. · Informe a todos los médicos o dentistas que lo atiendan que usted está tomando cindy medicamento. · No suspenda el uso de cindy medicamento súbitamente. Brown médico necesitará disminuir brown dosis poco a poco antes de suspender el medicamento por completo. · El médico solicitará exámenes de laboratorio sofia las citas de rutina para revisar los efectos de [employer] Asista a todas diya citas. · Guarde todos los medicamentos fuera del alcance de los niños. Nunca comparta diya medicamentos con [facility].  
Efectos secundarios que pueden presentarse sofia el uso de cindy medicamento:  
Consulte inmediatamente con el médico si nota cualquiera de estos efectos secundarios: 
· Reacción alérgica: Comezón o ronchas, hinchazón del [facility] o [facility], hinchazón u hormigueo en la boca o garganta, opresión en el pecho, dificultad para respirar · Dolor en el pecho que puede propagarse, dificultad para respirar, sudoración inusual, desmayos · Ritmo cardíaco acelerado, golpeante o irregular · Convulsiones o temblores · Dolor de faith severo, visión borrosa o doble, náusea, vómito (en niños) · Cojea al caminar, dolor en la rodilla o la cadera (en los niños) Consulte con el médico si nota los siguientes efectos secundarios menos graves: · Cambios de apetito o peso · Cambios en los periodos menstruales · Pérdida de benjamin · Nerviosismo, sensibilidad al calor, sudoración Consulte con el médico si nota otros efectos secundarios que vero son causados por cindy medicamento. Llame a brown médico para consultarle [place]. Usted puede notificar diya efectos secundarios al FDA al [phone]. © 2017 [facility] Information is for End User's use only and may not be sold, redistributed or otherwise used for commercial purposes. Esta información es sólo para uso en educación. Brown intención no es darle un consejo médico sobre enfermedades o tratamientos. Colsulte con brown Wendall Patter farmacéutico antes de seguir cualquier régimen médico para saber si es seguro y efectivo para usted. Introducing [facility]! [facility] introduce portal paciente MyChart . Ahora se puede acceder a partes de brown expediente médico, enviar por correo electrónico la oficina de brown médico y solicitar renovaciones de medicamentos en línea. En brown navegador de Internet , Aminah Zhao a [email or web address] Ursula clic en el usuario por Leila Cea? Cathryne Hurl clic aquí en la sesión Joel Richards. Verá la página de registro [place]. Ingrese brown código de [facility] y mich aparece a continuación.  Usted no tendrá que [employer] código después de morenita completado el proceso de registro . Si usted no se inscribe antes de la fecha de caducidad , debe solicitar un nuevo código. · MyChart Código de acceso : [id number] Expires: 4/24/2018  2:50 PM 
 
Ingresa los últimos cuatro dígitos de brown Número de Seguro Social ( xxxx ) y fecha de nacimiento ( dd / mm / aaaa ) mich se indica y ursula clic en Enviar. Usted será llevado a la siguiente página de registro . Crear un ID MyChart . Esta será brown ID de inicio de sesión de MyChart y no puede ser [place] , por lo que pensar en balaji que es Kei Gilbert y fácil de recordar . Crear balaji contraseña MyChart . Usted puede cambiar brown contraseña en cualquier momento . Ingrese brown Password Reset de preguntas y [place] . Byng se puede utilizar en un momento posterior si usted olvida brown contraseña. Introduzca brown dirección de correo electrónico . Cm Harden recibirá balaji notificación por correo electrónico cuando la nueva información está disponible en MyChart . Edy Griffes clic en Registrarse. Bonnita Pillar carson y descargar porciones de brown expediente médico. 
Ursula clic en el enlace de descarga del menú Resumen para descargar balaji copia portátil de brown información médica . Si tiene [employer] , por favor visite la sección de preguntas frecuentes del sitio web MyChart . Recuerde, MyChart NO es que se utilizará para las necesidades urgentes. Para emergencias médicas , llame al 911 . Ahora disponible en brown iPhone y Android ! Por favor proporcione cindy resumen de la documentación de cuidado a brown próximo proveedor. Your primary care clinician is listed as Neno Hernández. If you have any questions after today's visit, please call [phone].

## 2018-01-24 NOTE — PROGRESS NOTES
AVS printed and reviewed. E script discussed. Given printouts of last 3 TSH results as pt is moving on Feb 2, 2018 to [place]. Encouraged to establish care in [place] on arrival and given CAV Fax # for release of records w/ new PCP. Discussion assisted by BEN , Ancelmo Adams.

## 2018-07-09 DIAGNOSIS — E03.9 ACQUIRED HYPOTHYROIDISM: ICD-10-CM

## 2018-07-10 RX ORDER — LEVOTHYROXINE SODIUM 150 UG/1
TABLET ORAL
Qty: 30 TAB | Refills: 1 | Status: SHIPPED | OUTPATIENT
Start: 2018-07-10

## 2018-07-11 NOTE — TELEPHONE ENCOUNTER
Spoke to pt w/ [facility] [id number]. Informed refills sent to her pharmacy for 1 month w/ 1 refill for Levothyroxine. \"Needs to return for f/u for any further refills\". Pt states she has moved to [place] . Chart review shows medication is in [address]. Reviewed 2 months of refills sent and needs to establish care where she is living and no further refills from [facility]. Called w/ [employer] phone  #[phone] to review no further medication from [facility] area.

## 2019-10-24 NOTE — TELEPHONE ENCOUNTER
Addended by: DAQUAN CARRERA on: 10/24/2019 01:57 PM     Modules accepted: Orders     Called to pt after reviewing provider Sukh Harley NP and Dr Samantha Jeffrey notes. She had skipped doses prior to the 12/28/17 lab draw. Reviewed take in am w/ only water 1 hr prior to food. Will keep her appt on 1/24/18.

## 2024-04-23 NOTE — PROGRESS NOTES
CONTINUE: Topiramate 50mg daily, sumatriptan 50mg as needed for migraine     LIMIT OTC ANALGESICS (TYLENOL OR IBUPROFEN) TO NO MORE THAN 2 DOSES TWICE A WEEK TO AVOID MEDICATION OVERUSE HEADACHE (PREVIOUSLY KNOWN AS \"REBOUND HEADACHE\")    LIMIT CAFFEINE   Keep to 125 mg or less per day  Coffee: 1/2 cup/4 oz = 100 mg/day  Soda: 50 mg/can  Tea: 40 mg/small cup  Energy drinks may have large amounts of caffeine  Excedrin = 60 mg caffeine in one tablet.    Daily migraine vitamins:  Magnesium oxide 400mg daily (can take slightly higher dose based on availability)  CoQ10 200mg daily  Riboflavin (vitamin B2) 400mg daily (this is often hard to find at this dose, try looking online or taking half the dose if neccessary)  Melatonin 1-10mg nightly at the same time for sleep     ENCOURAGE 20-30 MINUTES OF EXERCISE AT LEAST 5 DAYS A WEEK    RETURN TO CLINIC IN 6 MONTHS FOR RE-EVALUATION    CALL CLINIC ANYTIME WITH ANY QUESTIONS, CONCERNS, NEW OR WORSENING SYMPTOMS.   Result note relayed med sent to pharmacy. No further questions.